# Patient Record
Sex: FEMALE | Race: ASIAN | NOT HISPANIC OR LATINO | URBAN - METROPOLITAN AREA
[De-identification: names, ages, dates, MRNs, and addresses within clinical notes are randomized per-mention and may not be internally consistent; named-entity substitution may affect disease eponyms.]

---

## 2019-10-25 ENCOUNTER — DOCUMENTATION (OUTPATIENT)
Dept: AUDIOLOGY | Age: 39
End: 2019-10-25

## 2019-10-25 NOTE — PROGRESS NOTES
Progress Note    Name:  Gudelia Colbert  :  1980  Age:  42 y o  Date of Evaluation: 10/25/19     Scanned in HA chart         Cleopatra Lopez , CCC-A  Clinical Audiologist